# Patient Record
Sex: FEMALE | Race: BLACK OR AFRICAN AMERICAN | NOT HISPANIC OR LATINO | Employment: UNEMPLOYED | URBAN - METROPOLITAN AREA
[De-identification: names, ages, dates, MRNs, and addresses within clinical notes are randomized per-mention and may not be internally consistent; named-entity substitution may affect disease eponyms.]

---

## 2018-11-26 ENCOUNTER — HOSPITAL ENCOUNTER (EMERGENCY)
Facility: HOSPITAL | Age: 38
Discharge: HOME | End: 2018-11-26
Attending: EMERGENCY MEDICINE
Payer: MEDICAID

## 2018-11-26 VITALS
DIASTOLIC BLOOD PRESSURE: 82 MMHG | HEART RATE: 81 BPM | OXYGEN SATURATION: 100 % | HEIGHT: 66 IN | BODY MASS INDEX: 44.42 KG/M2 | RESPIRATION RATE: 20 BRPM | WEIGHT: 276.38 LBS | SYSTOLIC BLOOD PRESSURE: 152 MMHG | TEMPERATURE: 98 F
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
## 2018-11-26 DIAGNOSIS — S30.0XXA COCCYX CONTUSION, INITIAL ENCOUNTER: Primary | ICD-10-CM
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
## 2018-11-26 DIAGNOSIS — M54.50 ACUTE MIDLINE LOW BACK PAIN WITHOUT SCIATICA: ICD-10-CM

## 2018-11-26 PROCEDURE — 99284 EMERGENCY DEPT VISIT MOD MDM: CPT | Mod: 25

## 2018-11-26 RX ORDER — METAXALONE 800 MG/1
800 TABLET ORAL 2 TIMES DAILY PRN
Qty: 14 TABLET | Refills: 0 | OUTPATIENT
Start: 2018-11-26 | End: 2018-11-28

## 2018-11-26 RX ORDER — IBUPROFEN 800 MG/1
800 TABLET ORAL 3 TIMES DAILY
Qty: 21 TABLET | Refills: 0 | OUTPATIENT
Start: 2018-11-26 | End: 2018-11-28

## 2018-11-26 NOTE — ED NOTES
"Pt stated " I fell at work yesterday and I did not feel anything until about 4 am this morning, my back felt like it was tightening up and my hand and foot felt numb."  "

## 2018-11-26 NOTE — ED PROVIDER NOTES
This SmartLink is deprecated. Use AVSMEDLIST instead to display the medication list for a patient. eMERGENCY dEPARTMENT eNCOUnter    CHIEF COMPLAINT    Chief Complaint   Patient presents with    Back Pain     Pt states was at work at [employer] yesterday and fell while standing and cooking, denies LOC.  States having lower to mid back pain.        LUIS DANIEL Jarrett is a 38 y.o. female who presents to the ED with lower back pain. States she was at work yesterday and slipped in the grease and fell and landed on her back.  She states she was fine at 1st but then woke up around 4:00 a.m. this morning in severe pain. She denies numbness or tingling.  She denies incontinence.  She denies head injury or neck pain.      CURRENT MEDICATIONS    No current facility-administered medications on file prior to encounter.      No current outpatient medications on file prior to encounter.         ALLERGIES    Review of patient's allergies indicates:  No Known Allergies    PAST MEDICAL HISTORY  History reviewed. No pertinent past medical history.    SURGICAL HISTORY    Past Surgical History:   Procedure Laterality Date     SECTION         SOCIAL HISTORY    Social History     Socioeconomic History    Marital status: Single     Spouse name: None    Number of children: None    Years of education: None    Highest education level: None   Social Needs    Financial resource strain: None    Food insecurity - worry: None    Food insecurity - inability: None    Transportation needs - medical: None    Transportation needs - non-medical: None   Occupational History    None   Tobacco Use    Smoking status: Current Every Day Smoker     Types: Cigarettes    Smokeless tobacco: Never Used   Substance and Sexual Activity    Alcohol use: No    Drug use: No    Sexual activity: None   Other Topics Concern    None   Social History Narrative    None       FAMILY HISTORY    Family History   Problem Relation Age of Onset  "   No Known Problems Mother     No Known Problems Father        REVIEW OF SYSTEMS   ROS  Constitutional:  No fever, chills, weight loss or weakness.   Eyes:  No  Photophobia, blurred vision or discharge.   HENT:  No ear pain, nasal congestion or sore throat..  Respiratory:  No cough, shortness of breath or wheezing.   Cardiovascular:  No chest pain, palpitations or swelling.   GI:  No abdominal pain, nausea, vomiting, or diarrhea.  : No dysuria, frequency   Musculoskeletal:  Reports lower back pain. Denies neck pain.   Skin:  No reported rashes or infected lesions.   Neurologic:  No reported headache, focal weakness or sensory changes.     All Systems otherwise negative except as noted in the History of Present Illness.        PHYSICAL EXAM    Reviewed Triage Note  VITAL SIGNS: Temp 98.3 °F (36.8 °C) (Oral)   Ht 5' 6.06" (1.678 m)   Wt 125.4 kg (276 lb 5.6 oz)   LMP 10/31/2018   BMI 44.52 kg/m²    Vitals:    11/26/18 1140   Temp: 98.3 °F (36.8 °C)       Physical Exam  Nursing Notes and Vital Signs Reviewed  Constitutional:  Well-developed, well-nourished, afebrile nontoxic-appearing 38-year-old female in NAD.  HENT:  Normocephalic, atraumatic. Bilateral external EACs normal. Nose normal,no rhinorrhea. Mouth mucus membranes P & M.   Eyes:  PERRL EOMI. Conjunctiva normal without discharge.   Neck: Normal range of motion. No midline tenderness or vertebral step-off. No stridor. No meningismus. No lymphadenopathy.   Respiratory:  Normal breath sounds bilaterally.  No respiratory distress, retractions, or conversational dyspnea. No wheezing. No rhonchi. No rales.   Cardiovascular:  Normal heart rate. Normal rhythm. No pitting lower extremity edema.   Musculoskeletal:  Lumbosacral No gross deformity.  No bony deformity or vertebral step-off.  Pain with rotation of trunk or bending at the waist. No palpable bony deformity. Noted tenderness to palpation sacral and coccyx area.  Integument:  Warm and dry. No rash. No " petechiae  Neurologic:  Normal motor function. Normal sensory function. No focal deficits noted. Alert and Interactive. MAEW. Gait steady.   Psychiatric:  Affect normal. Mood normal.         LABS  Pertinent labs reviewed. (See chart for details)           RADIOLOGY    Imaging Results          X-Ray Lumbar Spine Ap And Lateral (Final result)  Result time 11/26/18 13:32:13    Final result by BRE Patel Sr., MD (11/26/18 13:32:13)                 Impression:      1. The lumbar spine is normal in appearance.  2. There are Essure devices projected over the pelvis.      Electronically signed by: Vincenzo Patel MD  Date:    11/26/2018  Time:    13:32             Narrative:    EXAMINATION:  XR LUMBAR SPINE AP AND LATERAL    CLINICAL HISTORY:  Unspecified fall, initial encounterLow back pain, minor trauma;    COMPARISON:  None    FINDINGS:  There are 5 lumbar type vertebral bodies. There is no fracture, spondylolisthesis, or scoliosis. There is normal lumbar lordosis.  There are Essure devices projected over the pelvis.                               X-Ray Sacrum And Coccyx (Final result)  Result time 11/26/18 13:31:14    Final result by BRE Patel Sr., MD (11/26/18 13:31:14)                 Impression:      1. The sacrum and coccyx are normal in appearance.  2. There are Essure devices projected over the pelvis.      Electronically signed by: Vincenzo Patel MD  Date:    11/26/2018  Time:    13:31             Narrative:    EXAMINATION:  XR SACRUM AND COCCYX    CLINICAL HISTORY:  Unspecified fall, initial encounter    COMPARISON:  None    FINDINGS:  The sacrum and coccyx are normal in appearance.  There is no fracture.  There is no dislocation.  There is no spondylolisthesis.  There are Essure devices projected over the pelvis.                                PROCEDURES    Procedures      EKG         ED COURSE & MEDICAL DECISION MAKING    Pertinent & Imaging studies reviewed. (See chart for details and specific  orders.)  38-year-old female with back pain following a slip and fall yesterday at work.  No numbness, tingling or incontinence.  X-ray negative for fracture subluxation.  She was given a prescription for ibuprofen and Skelaxin.  She is advised to follow up with her PCP.  Return if worsening or concerns.    Medications - No data to display        FINAL IMPRESSION    1. Coccyx contusion, initial encounter    2. Fall    3. Acute midline low back pain without sciatica    4. Fall from ground level        Differential Diagnosis:  Cauda equina, lumbar compression fracture, coccyx fracture    Patient advised to follow-up with PCP for re-check                    Amber Dempsey NP  11/26/18 3911

## 2018-11-28 ENCOUNTER — HOSPITAL ENCOUNTER (EMERGENCY)
Facility: HOSPITAL | Age: 38
Discharge: HOME | End: 2018-11-28
Attending: FAMILY MEDICINE
Payer: MEDICAID

## 2018-11-28 VITALS
HEART RATE: 88 BPM | SYSTOLIC BLOOD PRESSURE: 180 MMHG | WEIGHT: 267 LBS | TEMPERATURE: 98 F | OXYGEN SATURATION: 100 % | DIASTOLIC BLOOD PRESSURE: 104 MMHG | RESPIRATION RATE: 18 BRPM | HEIGHT: 66 IN | BODY MASS INDEX: 42.91 KG/M2

## 2018-11-28 DIAGNOSIS — M54.9 ACUTE LEFT-SIDED BACK PAIN, UNSPECIFIED BACK LOCATION: Primary | ICD-10-CM

## 2018-11-28 PROCEDURE — 96372 THER/PROPH/DIAG INJ SC/IM: CPT

## 2018-11-28 PROCEDURE — 63600175 PHARM REV CODE 636 W HCPCS: Performed by: FAMILY MEDICINE

## 2018-11-28 PROCEDURE — 99284 EMERGENCY DEPT VISIT MOD MDM: CPT | Mod: 25

## 2018-11-28 RX ORDER — CYCLOBENZAPRINE HCL 10 MG
10 TABLET ORAL 3 TIMES DAILY PRN
Qty: 15 TABLET | Refills: 0 | Status: SHIPPED | OUTPATIENT
Start: 2018-11-28 | End: 2018-12-03

## 2018-11-28 RX ORDER — DICLOFENAC SODIUM 50 MG/1
50 TABLET, DELAYED RELEASE ORAL 2 TIMES DAILY
Qty: 21 TABLET | Refills: 0 | Status: SHIPPED | OUTPATIENT
Start: 2018-11-28 | End: 2019-12-16 | Stop reason: CLARIF

## 2018-11-28 RX ORDER — KETOROLAC TROMETHAMINE 30 MG/ML
60 INJECTION, SOLUTION INTRAMUSCULAR; INTRAVENOUS
Status: COMPLETED | OUTPATIENT
Start: 2018-11-28 | End: 2018-11-28

## 2018-11-28 RX ADMIN — KETOROLAC TROMETHAMINE 60 MG: 30 INJECTION, SOLUTION INTRAMUSCULAR at 08:11

## 2018-11-28 NOTE — ED PROVIDER NOTES
Encounter Date: 2018       History     Chief Complaint   Patient presents with    Back Pain     patient states that she fell at work on , was recently seen in the ER. Patient states that the medication she was prescribed is not working      38-year-old female comes in with a fall at work approximately 1 week ago was seen in the ED x-rayed given 1 Motrin and Skelaxin patient states the pain is getting worse.  Patient otherwise has no footdrop no loss of bowel bladder function no other complaints.          Review of patient's allergies indicates:  No Known Allergies  No past medical history on file.  Past Surgical History:   Procedure Laterality Date     SECTION       Family History   Problem Relation Age of Onset    No Known Problems Mother     No Known Problems Father      Social History     Tobacco Use    Smoking status: Current Every Day Smoker     Types: Cigarettes    Smokeless tobacco: Never Used   Substance Use Topics    Alcohol use: No    Drug use: No     Review of Systems   Constitutional: Negative for fever.   HENT: Negative for sore throat.    Respiratory: Negative for shortness of breath.    Cardiovascular: Negative for chest pain.   Gastrointestinal: Negative for nausea.   Genitourinary: Negative for dysuria.   Musculoskeletal: Positive for back pain.   Skin: Negative for rash.   Neurological: Negative for weakness.   Hematological: Does not bruise/bleed easily.   All other systems reviewed and are negative.      Physical Exam     Initial Vitals [18 0805]   BP Pulse Resp Temp SpO2   (!) 180/104 88 18 98.4 °F (36.9 °C) 100 %      MAP       --         Physical Exam    Nursing note and vitals reviewed.  Constitutional: She appears well-developed.   HENT:   Head: Normocephalic and atraumatic.   Right Ear: External ear normal.   Left Ear: External ear normal.   Nose: Nose normal.   Mouth/Throat: Oropharynx is clear and moist.   Eyes: Conjunctivae and EOM are normal. Pupils are  equal, round, and reactive to light. Right eye exhibits no discharge. Left eye exhibits no discharge.   Neck: Normal range of motion. Neck supple. No tracheal deviation present.   Cardiovascular: Normal rate, regular rhythm and normal heart sounds.   No murmur heard.  Pulmonary/Chest: Breath sounds normal. No respiratory distress. She has no wheezes.   Abdominal: Soft. Bowel sounds are normal.   Musculoskeletal: She exhibits tenderness.   Neurological: She is alert and oriented to person, place, and time.   Skin: Skin is warm and dry.         ED Course   Procedures  Labs Reviewed - No data to display       Imaging Results    None          Medical Decision Making:   Initial Assessment:   Patient in moderate distress and no other complains    Differential Diagnosis:   Lbp, disk herniation, injury, sprain , strain                        Clinical Impression:   The encounter diagnosis was Acute left-sided back pain, unspecified back location.                             Mat Haque MD  11/28/18 4689

## 2019-12-16 ENCOUNTER — HOSPITAL ENCOUNTER (EMERGENCY)
Facility: HOSPITAL | Age: 39
Discharge: HOME | End: 2019-12-16
Attending: FAMILY MEDICINE
Payer: MEDICAID

## 2019-12-16 VITALS
SYSTOLIC BLOOD PRESSURE: 170 MMHG | WEIGHT: 250 LBS | RESPIRATION RATE: 18 BRPM | OXYGEN SATURATION: 100 % | BODY MASS INDEX: 39.24 KG/M2 | HEIGHT: 67 IN | HEART RATE: 98 BPM | DIASTOLIC BLOOD PRESSURE: 98 MMHG

## 2019-12-16 DIAGNOSIS — K08.89 TOOTHACHE: Primary | ICD-10-CM

## 2019-12-16 DIAGNOSIS — H61.21 IMPACTED CERUMEN OF RIGHT EAR: ICD-10-CM

## 2019-12-16 PROCEDURE — 25000003 PHARM REV CODE 250: Mod: ER | Performed by: FAMILY MEDICINE

## 2019-12-16 PROCEDURE — 69210 REMOVE IMPACTED EAR WAX UNI: CPT | Mod: RT,ER

## 2019-12-16 PROCEDURE — 99284 EMERGENCY DEPT VISIT MOD MDM: CPT | Mod: 25,ER

## 2019-12-16 RX ORDER — NAPROXEN 500 MG/1
500 TABLET ORAL 2 TIMES DAILY
Qty: 20 TABLET | Refills: 0 | Status: SHIPPED | OUTPATIENT
Start: 2019-12-16

## 2019-12-16 RX ORDER — ACETAMINOPHEN 500 MG
1000 TABLET ORAL 3 TIMES DAILY PRN
Qty: 30 TABLET | Refills: 0
Start: 2019-12-16 | End: 2020-06-21

## 2019-12-16 RX ORDER — KETOROLAC TROMETHAMINE 10 MG/1
10 TABLET, FILM COATED ORAL
Status: COMPLETED | OUTPATIENT
Start: 2019-12-16 | End: 2019-12-16

## 2019-12-16 RX ADMIN — KETOROLAC TROMETHAMINE 10 MG: 10 TABLET, FILM COATED ORAL at 05:12

## 2019-12-16 NOTE — ED PROVIDER NOTES
"Encounter Date: 2019       History     Chief Complaint   Patient presents with    Dental Pain     40 y/o F to er with c/o "thumping dental pain I woke up with this morning on right side on top and bottom."     39-year-old female presents with chief complaint of dental pain. Patient reports something dental pain which started last night.  Reports also having difficulty hearing out of her right ear so decided presents emergency room for evaluation this morning.  Patient reports has had similar complaints multiple times in the past.  Reports unable to see a dentist at present because she has a additional family responsibilities with her children.        Review of patient's allergies indicates:  No Known Allergies  History reviewed. No pertinent past medical history.  Past Surgical History:   Procedure Laterality Date     SECTION       Family History   Problem Relation Age of Onset    No Known Problems Mother     No Known Problems Father      Social History     Tobacco Use    Smoking status: Current Every Day Smoker     Types: Cigarettes    Smokeless tobacco: Never Used   Substance Use Topics    Alcohol use: No    Drug use: No     Review of Systems   Constitutional: Negative for chills and fever.   HENT: Positive for dental problem. Negative for ear pain.    Respiratory: Negative for shortness of breath.    All other systems reviewed and are negative.      Physical Exam     Initial Vitals [19 0454]   BP Pulse Resp Temp SpO2   (!) 167/100 98 18 -- 100 %      MAP       --         Physical Exam    Nursing note and vitals reviewed.  Constitutional: She appears well-developed and well-nourished.   HENT:   Head: Normocephalic and atraumatic.   Mouth/Throat: Oropharynx is clear and moist. Abnormal dentition. Dental caries present. No tonsillar abscesses.       Eyes: Conjunctivae and EOM are normal. Pupils are equal, round, and reactive to light.   Neck: Normal range of motion. Neck supple. "   Cardiovascular: Normal rate, regular rhythm and normal heart sounds.   Pulmonary/Chest: Breath sounds normal.   Abdominal: Soft.   Musculoskeletal: Normal range of motion.   Lymphadenopathy:     She has cervical adenopathy.   Neurological: She is alert and oriented to person, place, and time. She has normal strength. GCS score is 15. GCS eye subscore is 4. GCS verbal subscore is 5. GCS motor subscore is 6.   Skin: Skin is warm. Capillary refill takes less than 2 seconds.   Psychiatric: She has a normal mood and affect. Her behavior is normal. Thought content normal.         ED Course   Procedures  Labs Reviewed - No data to display       Imaging Results    None                                          Clinical Impression:       ICD-10-CM ICD-9-CM   1. Toothache K08.89 525.9   2. Impacted cerumen of right ear H61.21 380.4                             Gilmar Robbins MD  12/16/19 0521

## 2019-12-16 NOTE — ED NOTES
Pt brought from triage to rm 7 to triage in rm.  Pt ambulatory, MAEW, with NADN at this time.   Pt sitting on side of bed (locked and low), with SRx1.  Call bell in reach.

## 2019-12-16 NOTE — ED NOTES
Educated pt to follow up, medications recommended, and when to return to the ER  Pt verbalized understanding.

## 2020-06-21 ENCOUNTER — HOSPITAL ENCOUNTER (EMERGENCY)
Facility: HOSPITAL | Age: 40
Discharge: HOME | End: 2020-06-21
Attending: EMERGENCY MEDICINE
Payer: MEDICAID

## 2020-06-21 VITALS
OXYGEN SATURATION: 100 % | HEART RATE: 80 BPM | HEIGHT: 66 IN | TEMPERATURE: 99 F | DIASTOLIC BLOOD PRESSURE: 98 MMHG | WEIGHT: 293 LBS | BODY MASS INDEX: 47.09 KG/M2 | SYSTOLIC BLOOD PRESSURE: 175 MMHG | RESPIRATION RATE: 18 BRPM

## 2020-06-21 DIAGNOSIS — I10 HYPERTENSION, UNSPECIFIED TYPE: ICD-10-CM

## 2020-06-21 DIAGNOSIS — K08.89 PAIN, DENTAL: Primary | ICD-10-CM

## 2020-06-21 LAB
ALBUMIN SERPL BCP-MCNC: 4 G/DL (ref 3.5–5.2)
ALP SERPL-CCNC: 98 U/L (ref 38–126)
ALT SERPL W/O P-5'-P-CCNC: 27 U/L (ref 10–44)
ANION GAP SERPL CALC-SCNC: 8 MMOL/L (ref 8–16)
AST SERPL-CCNC: 29 U/L (ref 15–46)
B-HCG UR QL: NEGATIVE
BILIRUB SERPL-MCNC: 0.7 MG/DL (ref 0.1–1)
BUN SERPL-MCNC: 12 MG/DL (ref 7–17)
CALCIUM SERPL-MCNC: 9.3 MG/DL (ref 8.7–10.5)
CHLORIDE SERPL-SCNC: 103 MMOL/L (ref 95–110)
CO2 SERPL-SCNC: 27 MMOL/L (ref 23–29)
CREAT SERPL-MCNC: 0.95 MG/DL (ref 0.5–1.4)
EST. GFR  (AFRICAN AMERICAN): >60 ML/MIN/1.73 M^2
EST. GFR  (NON AFRICAN AMERICAN): >60 ML/MIN/1.73 M^2
GLUCOSE SERPL-MCNC: 109 MG/DL (ref 70–110)
POTASSIUM SERPL-SCNC: 4.1 MMOL/L (ref 3.5–5.1)
PROT SERPL-MCNC: 7.5 G/DL (ref 6–8.4)
SODIUM SERPL-SCNC: 138 MMOL/L (ref 136–145)

## 2020-06-21 PROCEDURE — 81025 URINE PREGNANCY TEST: CPT | Mod: ER

## 2020-06-21 PROCEDURE — 80053 COMPREHEN METABOLIC PANEL: CPT | Mod: ER

## 2020-06-21 PROCEDURE — 96372 THER/PROPH/DIAG INJ SC/IM: CPT | Mod: ER

## 2020-06-21 PROCEDURE — 25000003 PHARM REV CODE 250: Mod: ER | Performed by: EMERGENCY MEDICINE

## 2020-06-21 PROCEDURE — 63600175 PHARM REV CODE 636 W HCPCS: Mod: ER | Performed by: EMERGENCY MEDICINE

## 2020-06-21 PROCEDURE — 99284 EMERGENCY DEPT VISIT MOD MDM: CPT | Mod: 25,ER

## 2020-06-21 RX ORDER — IBUPROFEN 600 MG/1
600 TABLET ORAL EVERY 6 HOURS PRN
Qty: 28 TABLET | Refills: 0 | Status: SHIPPED | OUTPATIENT
Start: 2020-06-21 | End: 2021-08-22 | Stop reason: ALTCHOICE

## 2020-06-21 RX ORDER — AMLODIPINE BESYLATE 5 MG/1
5 TABLET ORAL DAILY
Qty: 30 TABLET | Refills: 0 | Status: SHIPPED | OUTPATIENT
Start: 2020-06-21 | End: 2020-07-21

## 2020-06-21 RX ORDER — PENICILLIN V POTASSIUM 500 MG/1
500 TABLET, FILM COATED ORAL 4 TIMES DAILY
Qty: 20 TABLET | Refills: 0 | Status: SHIPPED | OUTPATIENT
Start: 2020-06-21 | End: 2020-06-26

## 2020-06-21 RX ORDER — CAPTOPRIL 12.5 MG/1
12.5 TABLET ORAL
Status: COMPLETED | OUTPATIENT
Start: 2020-06-21 | End: 2020-06-21

## 2020-06-21 RX ORDER — ACETAMINOPHEN 500 MG
500 TABLET ORAL EVERY 6 HOURS PRN
Qty: 28 TABLET | Refills: 0 | Status: SHIPPED | OUTPATIENT
Start: 2020-06-21

## 2020-06-21 RX ORDER — KETOROLAC TROMETHAMINE 30 MG/ML
30 INJECTION, SOLUTION INTRAMUSCULAR; INTRAVENOUS
Status: COMPLETED | OUTPATIENT
Start: 2020-06-21 | End: 2020-06-21

## 2020-06-21 RX ADMIN — CAPTOPRIL 12.5 MG: 12.5 TABLET ORAL at 08:06

## 2020-06-21 RX ADMIN — KETOROLAC TROMETHAMINE 30 MG: 30 INJECTION, SOLUTION INTRAMUSCULAR at 08:06

## 2020-06-21 NOTE — ED PROVIDER NOTES
Encounter Date: 2020       History     Chief Complaint   Patient presents with    Dental Pain     Pt with c/o dental pain to topr R side of mouth since yesterday.      Aleshia Jarrett is a 39 y.o. female who  has no past medical history on file.  She presents today with dental pain onset this morning at 4:00 a.m..  She tried smoking a cigarette to relieve her symptoms which did not help.  Patient was seen here in 2019 for similar symptoms however had not followed up with a dentist signs due to finding issues.  Patient is also noted to be hypertensive in triage and subsequently repeat blood pressure is 202/89.  She denies history of high blood pressure.  She has not had her kidney function checked.          Review of patient's allergies indicates:  No Known Allergies  History reviewed. No pertinent past medical history.  Past Surgical History:   Procedure Laterality Date     SECTION       Family History   Problem Relation Age of Onset    No Known Problems Mother     No Known Problems Father      Social History     Tobacco Use    Smoking status: Current Every Day Smoker     Types: Cigarettes    Smokeless tobacco: Never Used   Substance Use Topics    Alcohol use: No    Drug use: No     Review of Systems   Constitutional: Negative for chills and fever.   HENT: Negative for sore throat.    Respiratory: Negative for cough and shortness of breath.    Cardiovascular: Negative for chest pain.   Gastrointestinal: Negative for nausea and vomiting.   Genitourinary: Negative for dysuria, frequency and urgency.   Musculoskeletal: Negative for back pain, neck pain and neck stiffness.   Skin: Negative for rash and wound.   Neurological: Negative for syncope and weakness.   Hematological: Does not bruise/bleed easily.   Psychiatric/Behavioral: Negative for agitation, behavioral problems and confusion.       Physical Exam     Initial Vitals [20 0729]   BP Pulse Resp Temp SpO2   (!) 176/105 88 18  98.5 °F (36.9 °C) 100 %      MAP       --         Physical Exam    Constitutional: No distress.   HENT:   Head: Normocephalic and atraumatic.   Mouth/Throat:       Dental caries, no evidence of abscess.  Tooth causing pain as shown with severe pulpitis   Eyes: Conjunctivae are normal.   Cardiovascular: Intact distal pulses.   Pulmonary/Chest: No respiratory distress.   Neurological: She is alert.   Skin: Skin is warm and dry.   Psychiatric: She has a normal mood and affect.         ED Course   Procedures  Labs Reviewed   PREGNANCY TEST, URINE RAPID    Narrative:     Specimen Source->Urine   COMPREHENSIVE METABOLIC PANEL          Imaging Results    None          Medical Decision Making:   Differential Diagnosis:   Differential Diagnosis includes, but is not limited to:  Del's angina, acute necrotizing ulcerative gingivitis, epiglottitis, parotitis, gingival abscess, facial cellulitis, peritonsillar/retropharyngeal abscess, sialolithiasis, periapical abscess, pulpitis, dental fracture, dental caries, aphthous ulcer.    ED Management:  After complete evaluation, including thorough history and physical exam, the patient's symptoms are most consistent with benign dentalgia due to dental caries. Physical exam is benign, without significant facial, tongue, or neck swelling to suggest cellulitis, abscess, or Del's angina. The patient is tolerating secretions without drooling, dysphagia, or voice changes to suggest deep space neck infection. There is no intraoral or gingival fluctuance to suggest abscess requiring incision/drainage at this time. However, due to inability to completely rule out pulpitis or periapical abscess, plan to cover with Pen-VK.  Recommend symptomatic care with NSAIDs for pain in interim. Patient was instructed to follow-up with a dentist as soon as possible for further evaluation and tooth extraction if needed.    After taking into careful account the historical factors and physical exam  findings of the patient's presentation today, in conjunction with the empirical and objective data obtained on ED workup, no acute emergent medical condition has been identified. The patient appears to be low risk for an emergent medical condition and I feel it is safe and appropriate at this time for the patient to be discharged to follow-up as detailed in their discharge instructions for reevaluation and possible continued outpatient workup and management. I have discussed the specifics of the workup with the patient and the patient has verbalized understanding of the details of the workup, the diagnosis, the treatment plan, and the need for outpatient follow-up.  Although the patient has no emergent etiology today this does not preclude the development of an emergent condition so in addition, I have advised the patient that they can return to the ED and/or activate EMS at any time with worsening of their symptoms, change of their symptoms, or with any other medical complaint.  The patient remained comfortable and stable during their visit in the ED.  Discharge and follow-up instructions discussed with the patient who expressed understanding and willingness to comply with my recommendations.                     ED Course as of Jun 21 0914   Sun Jun 21, 2020   0736 Patient is sleeping on my evaluation    [RN]   0758 Patient presents with dental pain however she is noted to be hypertensive.  Chart review reveals similar with elevated blood pressures.  She has not had her kidney function checked on chart review and does not have a diagnosis of high blood pressure. Her dental exam shows numerous caries, worse in the right upper molar. Feel she would benefit from treatment for hypertension and will refer to PCP and dental for her toothache.     [RN]   0902 Comprehensive metabolic panel [RN]   0913 Labs reviewed.  No significant abnormality.  Blood pressure has improved.  Will start on amlodipine Motrin Tylenol for  pain.  Ambulatory referral for [facility] made given dental resources.  Return precautions discussed for emergent conditions, fever swelling or any other concerns.    [RN]      ED Course User Index  [RN] Kaiden De Jr., MD                Clinical Impression:       ICD-10-CM ICD-9-CM   1. Pain, dental  K08.89 525.9   2. Hypertension, unspecified type  I10 401.9       Portions of this note were dictated using voice recognition software and may contain dictation related errors in spelling/grammar/syntax not found on text review                           Kaiden De Jr., MD  06/21/20 0916

## 2021-08-22 ENCOUNTER — HOSPITAL ENCOUNTER (EMERGENCY)
Facility: HOSPITAL | Age: 41
Discharge: HOME | End: 2021-08-22
Attending: EMERGENCY MEDICINE
Payer: MEDICAID

## 2021-08-22 VITALS
TEMPERATURE: 99 F | HEIGHT: 66 IN | SYSTOLIC BLOOD PRESSURE: 176 MMHG | BODY MASS INDEX: 40.18 KG/M2 | RESPIRATION RATE: 20 BRPM | DIASTOLIC BLOOD PRESSURE: 93 MMHG | OXYGEN SATURATION: 97 % | WEIGHT: 250 LBS | HEART RATE: 80 BPM

## 2021-08-22 DIAGNOSIS — S39.012A STRAIN OF LUMBAR REGION, INITIAL ENCOUNTER: Primary | ICD-10-CM

## 2021-08-22 PROCEDURE — 96372 THER/PROPH/DIAG INJ SC/IM: CPT | Mod: ER

## 2021-08-22 PROCEDURE — 99284 EMERGENCY DEPT VISIT MOD MDM: CPT | Mod: 25,ER

## 2021-08-22 PROCEDURE — 63600175 PHARM REV CODE 636 W HCPCS: Mod: ER | Performed by: EMERGENCY MEDICINE

## 2021-08-22 PROCEDURE — 25000003 PHARM REV CODE 250: Mod: ER | Performed by: EMERGENCY MEDICINE

## 2021-08-22 RX ORDER — TRAMADOL HYDROCHLORIDE 50 MG/1
50 TABLET ORAL EVERY 6 HOURS PRN
Qty: 12 TABLET | Refills: 0 | Status: SHIPPED | OUTPATIENT
Start: 2021-08-22

## 2021-08-22 RX ORDER — CYCLOBENZAPRINE HCL 10 MG
10 TABLET ORAL
Status: COMPLETED | OUTPATIENT
Start: 2021-08-22 | End: 2021-08-22

## 2021-08-22 RX ORDER — TIZANIDINE 4 MG/1
4 TABLET ORAL EVERY 8 HOURS PRN
Qty: 15 TABLET | Refills: 0 | Status: SHIPPED | OUTPATIENT
Start: 2021-08-22 | End: 2021-09-01

## 2021-08-22 RX ORDER — DICLOFENAC SODIUM 50 MG/1
50 TABLET, DELAYED RELEASE ORAL 3 TIMES DAILY PRN
Qty: 30 TABLET | Refills: 0 | Status: SHIPPED | OUTPATIENT
Start: 2021-08-22

## 2021-08-22 RX ORDER — KETOROLAC TROMETHAMINE 30 MG/ML
60 INJECTION, SOLUTION INTRAMUSCULAR; INTRAVENOUS
Status: COMPLETED | OUTPATIENT
Start: 2021-08-22 | End: 2021-08-22

## 2021-08-22 RX ADMIN — KETOROLAC TROMETHAMINE 60 MG: 30 INJECTION, SOLUTION INTRAMUSCULAR; INTRAVENOUS at 05:08

## 2021-08-22 RX ADMIN — CYCLOBENZAPRINE 10 MG: 10 TABLET, FILM COATED ORAL at 05:08

## 2021-12-28 ENCOUNTER — HOSPITAL ENCOUNTER (EMERGENCY)
Facility: HOSPITAL | Age: 41
Discharge: LEFT BEFORE DISCHARGE | End: 2021-12-28
Attending: EMERGENCY MEDICINE
Payer: MEDICAID

## 2021-12-28 VITALS
HEIGHT: 66 IN | WEIGHT: 280 LBS | DIASTOLIC BLOOD PRESSURE: 91 MMHG | RESPIRATION RATE: 18 BRPM | OXYGEN SATURATION: 98 % | TEMPERATURE: 99 F | BODY MASS INDEX: 45 KG/M2 | SYSTOLIC BLOOD PRESSURE: 169 MMHG | HEART RATE: 86 BPM

## 2021-12-28 DIAGNOSIS — U07.1 COVID: Primary | ICD-10-CM

## 2021-12-28 LAB
INFLUENZA A, MOLECULAR: NEGATIVE
INFLUENZA B, MOLECULAR: NEGATIVE
SARS-COV-2 RDRP RESP QL NAA+PROBE: POSITIVE
SPECIMEN SOURCE: NORMAL

## 2021-12-28 PROCEDURE — 99283 EMERGENCY DEPT VISIT LOW MDM: CPT | Mod: 25,ER

## 2021-12-28 PROCEDURE — U0002 COVID-19 LAB TEST NON-CDC: HCPCS | Mod: ER | Performed by: EMERGENCY MEDICINE

## 2021-12-28 PROCEDURE — 87502 INFLUENZA DNA AMP PROBE: CPT | Mod: ER | Performed by: EMERGENCY MEDICINE

## 2021-12-28 RX ORDER — ACETAMINOPHEN 500 MG
1000 TABLET ORAL EVERY 6 HOURS PRN
Qty: 50 TABLET | Refills: 0 | Status: SHIPPED | OUTPATIENT
Start: 2021-12-28

## 2021-12-28 NOTE — Clinical Note
"Aleshia"Michael Jarrett was seen and treated in our emergency department on 12/28/2021.     COVID-19 is present in our communities across the state. There is limited testing for COVID at this time, so not all patients can be tested. In this situation, your employee meets the following criteria:    Aleshia Jarrett has met the criteria for COVID-19 testing and has a POSITIVE result. She can return to work once they are asymptomatic for 72 hours without the use of fever reducing medications AND at least ten days from the first positive result.     If you have any questions or concerns, or if I can be of further assistance, please do not hesitate to contact me.    Sincerely,             Jeb Tavera MD"

## 2021-12-29 NOTE — DISCHARGE INSTRUCTIONS
Mrs. Jarrett,    Thank you for letting me care for you today! It was nice meeting you, and I hope you feel better soon.   If you would like access to your chart and what was done today please utilize the [facility].   Please come back to [facility] for all of your future medical needs.    Our goal in the emergency department is to always give you outstanding care and exceptional service. You may receive a survey by mail or e-mail in the next week regarding your experience in our ED. We would greatly appreciate you completing and returning the survey. Your feedback provides us with a way to recognize our staff who give very good care and it helps us learn how to improve when your experience was below our aspiration of excellence.     Sincerely,    Jeb Tavera MD  Board Certified Emergency Physician

## 2021-12-29 NOTE — ED PROVIDER NOTES
Encounter Date: 2021       History     Chief Complaint   Patient presents with    Influenza     PT reports she wants to see if she has the flu. Body aches, headaches, and nasal drainage x1 day. No fever reported. Tylenol Cold taken at 12 pm with no relief. No other symptoms reported.      41-year-old female who presents for evaluation of body aches headaches for 1 day.  She started feeling bad while work today, thereafter decided to be evaluated emergency department.  She denies any known sick contacts but does work at the [employer].  She has not received the flu vaccine or the COVID vaccine this year.  She has never had anything like this in the past she can recall, nothing in particular seems to make it any better or worse.        Review of patient's allergies indicates:  No Known Allergies  History reviewed. No pertinent past medical history.  Past Surgical History:   Procedure Laterality Date     SECTION       Family History   Problem Relation Age of Onset    No Known Problems Mother     No Known Problems Father      Social History     Tobacco Use    Smoking status: Current Every Day Smoker     Packs/day: 0.25     Types: Cigarettes    Smokeless tobacco: Never Used   Substance Use Topics    Alcohol use: No    Drug use: No     Review of Systems  Constitutional-+ fever  HEENT-no congestion  Eyes-no redness  Respiratory-no shortness of breath  Cardio-no chest pain  GI-no abdominal pain  Endocrine-no cold intolerance  -no difficulty urinating  MSK-no myalgias  Skin-no rashes  Allergy-no environmental allergy  Neurologic-positive headache  Hematology-no swollen nodes  Behavioral-no confusion  Physical Exam     Initial Vitals [21 2207]   BP Pulse Resp Temp SpO2   (!) 169/91 86 18 99.1 °F (37.3 °C) 98 %      MAP       --         Physical Exam  Constitutional: Well appearing, no distress.  Eyes: Conjunctivae normal.  ENT       Head: Normocephalic, atraumatic.       Nose:  Normal external appearance        Mouth/Throat: no strigulous respirations   Hematological/Lymphatic/Immunilogical: no visible lymphadenopathy   Cardiovascular: Normal rate,   Respiratory: Normal respiratory effort.   Gastrointestinal: non distended   Musculoskeletal: Normal range of motion in all extremities. No obvious deformities or swelling.  Neurologic: Alert, oriented. Normal speech and language. No gross focal neurologic deficits are appreciated.  Skin: Skin is warm, dry. No rash noted.  Psychiatric: Mood and affect are normal.   ED Course   Procedures  Labs Reviewed   SARS-COV-2 RNA AMPLIFICATION, QUAL - Abnormal; Notable for the following components:       Result Value    SARS-CoV-2 RNA, Amplification, Qual Positive (*)     All other components within normal limits    Narrative:     Is the patient symptomatic?->Yes  Covid result(s) called and verbal readback obtained from G[facility] Wade by   LUIS 12/28/2021 22:29   INFLUENZA A & B BY MOLECULAR          Imaging Results    None          Medications - No data to display  Medical Decision Making:   Differential Diagnosis:   Given the current prevalent for severe respiratory infections and minimally symptomatic respiratory infections resultant from Coronavirus this serious consideration.  They are also other considerations however such as pneumonia, sepsis, influenza among myriad of other respiratory infections such as RSV or atypical pneumonia.  These are all thoroughly considered in the differential diagnosis of this patient, given testing results clinical impression inpatient appearance a discussion was made about the most likely diagnosis in the importance of treatment with appropriate quarantine precautions.  Clinical Tests:   Lab Tests: Ordered and Reviewed                      Clinical Impression:   Final diagnoses:  [U07.1] COVID (Primary)          ED Disposition Condition    Discharge Stable        ED Prescriptions     Medication Sig Dispense Start Date End  Date Auth. Provider    acetaminophen (TYLENOL) 500 MG tablet Take 2 tablets (1,000 mg total) by mouth every 6 (six) hours as needed for Pain or Temperature greater than. 50 tablet 12/28/2021  Jeb Tavera MD        Follow-up Information     Follow up With Specialties Details Why Contact [facility] - Emergency Dept Emergency Medicine Go to  If symptoms worsen [address] [facility] [address] [phone]           Jeb Tavera MD  12/28/21 4509

## 2021-12-29 NOTE — ED NOTES
Multiple attempts made to discharge pt. Pt is no longer in lobby. Evaluated by provider prior to elopement.

## 2022-01-04 ENCOUNTER — LAB VISIT (OUTPATIENT)
Dept: PRIMARY CARE CLINIC | Facility: OTHER | Age: 42
End: 2022-01-04
Attending: INTERNAL MEDICINE
Payer: MEDICAID

## 2022-01-04 DIAGNOSIS — U07.1 COVID-19: Primary | ICD-10-CM

## 2022-01-04 LAB
CTP QC/QA: YES
SARS-COV-2 AG RESP QL IA.RAPID: POSITIVE

## 2022-01-04 PROCEDURE — 87811 SARS CORONAVIRUS 2 ANTIGEN POCT, MANUAL READ: ICD-10-PCS | Mod: ,,, | Performed by: INTERNAL MEDICINE

## 2022-01-04 PROCEDURE — 87811 SARS-COV-2 COVID19 W/OPTIC: CPT | Mod: ,,, | Performed by: INTERNAL MEDICINE

## 2022-01-04 NOTE — PROGRESS NOTES
Instructions for Patients with Confirmed or Suspected COVID-19    If you are awaiting your test result, you will either be called or it will be released to the patient portal.  If you have any questions about your test, please visit [email or web address] or call our COVID-19 information line at [phone].      Please isolate yourself at home.  You may leave home and/or return to work once the following conditions are met:    If you have symptoms and tested positive:   More than 5 days since symptoms first appeared AND   More than 24 hours fever free without medications AND       symptoms have improved   · For five days after ending isolation, masks are required.    If you had no symptoms but tested positive:   More than 5 days since the date of the first positive test. If you develop symptoms, then use the guidelines above  · For five days after ending isolation, masks are required.      Testing is not recommended if you are symptom free after completing isolation.

## 2024-01-05 ENCOUNTER — HOSPITAL ENCOUNTER (EMERGENCY)
Facility: HOSPITAL | Age: 44
Discharge: HOME | End: 2024-01-05
Attending: EMERGENCY MEDICINE
Payer: MEDICAID

## 2024-01-05 VITALS
HEIGHT: 61 IN | SYSTOLIC BLOOD PRESSURE: 167 MMHG | DIASTOLIC BLOOD PRESSURE: 89 MMHG | BODY MASS INDEX: 47.2 KG/M2 | OXYGEN SATURATION: 99 % | RESPIRATION RATE: 19 BRPM | HEART RATE: 68 BPM | TEMPERATURE: 99 F | WEIGHT: 250 LBS

## 2024-01-05 DIAGNOSIS — J10.1 INFLUENZA B: Primary | ICD-10-CM

## 2024-01-05 LAB
GROUP A STREP, MOLECULAR: NEGATIVE
INFLUENZA A, MOLECULAR: NEGATIVE
INFLUENZA B, MOLECULAR: POSITIVE
SARS-COV-2 RDRP RESP QL NAA+PROBE: NEGATIVE
SPECIMEN SOURCE: ABNORMAL

## 2024-01-05 PROCEDURE — 87502 INFLUENZA DNA AMP PROBE: CPT | Mod: ER | Performed by: EMERGENCY MEDICINE

## 2024-01-05 PROCEDURE — 87651 STREP A DNA AMP PROBE: CPT | Mod: ER | Performed by: EMERGENCY MEDICINE

## 2024-01-05 PROCEDURE — U0002 COVID-19 LAB TEST NON-CDC: HCPCS | Mod: ER | Performed by: EMERGENCY MEDICINE

## 2024-01-05 PROCEDURE — 99282 EMERGENCY DEPT VISIT SF MDM: CPT | Mod: ER

## 2024-01-05 NOTE — ED PROVIDER NOTES
Encounter Date: 2024       History     Chief Complaint   Patient presents with    Influenza     Pt C/O flu like symptoms X 1 week.         Aleshia Jarrett is a 43 y.o. female  has no past medical history on file. presenting to the Emergency Department for flu-like symptoms x1 week.  Patient reports initial nasal congestion, cough, diarrhea, decreased appetite.  Patient's only complaints today are the cough and diarrhea.  Reports 1 episode of loose stool yesterday.  No fevers or chills.  No abdominal pain, nausea, vomiting.  Reports shortness of breath and chest pain associated with the cough.  Patient has tried NyQuil without significant relief.        The history is provided by the patient.     Review of patient's allergies indicates:  No Known Allergies  No past medical history on file.  Past Surgical History:   Procedure Laterality Date     SECTION       Family History   Problem Relation Age of Onset    No Known Problems Mother     No Known Problems Father      Social History     Tobacco Use    Smoking status: Every Day     Current packs/day: 0.25     Types: Cigarettes    Smokeless tobacco: Never   Substance Use Topics    Alcohol use: No    Drug use: No     Review of Systems   Constitutional:  Negative for fever.   HENT:  Positive for congestion and sore throat.    Respiratory:  Positive for cough. Negative for shortness of breath.    Cardiovascular:  Negative for chest pain.   Gastrointestinal:  Positive for diarrhea. Negative for abdominal pain, constipation, nausea and vomiting.   Genitourinary:  Negative for dysuria.   Musculoskeletal:  Negative for back pain.   Skin:  Negative for rash.   Neurological:  Negative for weakness.   Hematological:  Does not bruise/bleed easily.   All other systems reviewed and are negative.      Physical Exam     Initial Vitals [24 1013]   BP Pulse Resp Temp SpO2   (!) 167/89 68 19 98.7 °F (37.1 °C) 99 %      MAP       --         Physical Exam    Nursing note  and vitals reviewed.  Constitutional: She appears well-developed and well-nourished. She is not diaphoretic. No distress.   HENT:   Head: Normocephalic.   Right Ear: Hearing, tympanic membrane and external ear normal.   Left Ear: Hearing, tympanic membrane and external ear normal.   Nose: Nose normal.   Mouth/Throat: Oropharynx is clear and moist.   Eyes: Conjunctivae, EOM and lids are normal. Pupils are equal, round, and reactive to light.   Neck: Neck supple.   Normal range of motion.  Cardiovascular:  Normal rate, regular rhythm and normal heart sounds.           Pulmonary/Chest: Breath sounds normal. No respiratory distress. She has no wheezes. She has no rhonchi.   Abdominal: Abdomen is soft. Bowel sounds are normal. There is no abdominal tenderness. There is no rebound and no guarding.   Musculoskeletal:         General: Normal range of motion.      Cervical back: Normal range of motion and neck supple. No rigidity.     Lymphadenopathy:     She has no cervical adenopathy.   Neurological: She is alert and oriented to person, place, and time. She has normal strength. GCS score is 15. GCS eye subscore is 4. GCS verbal subscore is 5. GCS motor subscore is 6.   Skin: Skin is warm and dry. Capillary refill takes less than 2 seconds. No rash noted.   Psychiatric: She has a normal mood and affect. Her behavior is normal. Judgment and thought content normal.         ED Course   Procedures  Labs Reviewed   INFLUENZA A & B BY MOLECULAR - Abnormal; Notable for the following components:       Result Value    Influenza B, Molecular Positive (*)     All other components within normal limits   GROUP A STREP, MOLECULAR   SARS-COV-2 RNA AMPLIFICATION, QUAL    Narrative:     Is the patient symptomatic?->Yes          Imaging Results    None          Medications - No data to display  Medical Decision Making  This is an emergent evaluation of 43 y.o. female   in the ED presenting for cough and diarrhea. Physical exam reveals a  non-toxic, afebrile, and well-appearing female  in no apparent respiratory distress. Pertinent physical exam findings above. Vital signs stable. If available, previous records reviewed.    My overall impression is influenza B. Differential Diagnoses: Sepsis, meningitis, nasal/aspirated foreign body, OM, OE, nasal polyp, bacterial sinusitis, allergic rhinitis, peritonsillar abscess, retropharyngeal abscess, epiglottitis, bacterial/viral pneumonia, bacterial/viral pharyngitis, croup, bronchiolitis, influenza, viral syndrome     Discharge Meds/Instructions: OTC medications. PCP f/u    There does not appear to be any indication for further emergent testing, observation, or hospitalization at this time.  Patient appears stable for and is comfortable with discharge home. The diagnosis, treatment plan, instructions for follow-up as well as ED return precautions were discussed. Advised to follow-up with PCP for outpatient follow-up in 2-3 days. Signs and symptoms that would warrant immediate return to ED were reviewed prior to discharge. All questions and concerns were asked, answered, and addressed. Patient expressed understanding and agreement with the plan.       Amount and/or Complexity of Data Reviewed  Labs: ordered. Decision-making details documented in ED Course.    Risk  OTC drugs.               ED Course as of 01/05/24 1146   Fri Jan 05, 2024   1120 Group A Strep, Molecular: Negative [LH]   1120 SARS-CoV-2 RNA, Amplification, Qual: Negative [LH]   1120 Influenza A, Molecular: Negative [LH]   1120 Influenza B, Molecular(!): Positive [LH]      ED Course User Index  [LH] Tia Sánchez PA-C                           Clinical Impression:  Final diagnoses:  [J10.1] Influenza B (Primary)          ED Disposition Condition    Discharge Stable          ED Prescriptions    None       Follow-up Information    None          Tia Sánchez PA-C  01/05/24 1146

## 2024-01-05 NOTE — DISCHARGE INSTRUCTIONS
You have been diagnosed with Influenza.   - Rest.    - Drink plenty of fluids.  - Avoid crowds and wash your hands.   - Viral upper respiratory infections typically run their course in 10-14 days. You are  contagious for 24 hours after starting the [place] or 24 hours after last fever, whichever happens last. Tamiflu is only indicated if your symptoms began less than 2 days ago.   - Tylenol (acetaminophen) or Ibuprofen as directed as needed for fever/pain. Avoid tylenol if you have a history of liver disease. Do not take ibuprofen if you have a history of GI bleeding, kidney disease, gastric surgery, or if you take blood thinners.   - You can take the cetirizine/zyrtec as directed. These are antihistamines that can help with runny nose, nasal congestion, sneezing, and helps to dry up post-nasal drip, which usually causes sore throat and cough.  - You can use Flonase (fluticasone) nasal spray as directed for sinus congestion and postnasal drip. This is a steroid nasal spray that works locally over time to decrease the inflammation in your nose/sinuses and help with allergic symptoms. This is not an quick- relief spray like afrin, but it works well if used daily.  Discontinue if you develop a nose bleed.  - use nasal saline prior to Flonase.  - Use Ocean Spray Nasal Saline 1-3 puffs each nostril every 2-3 hours then blow out onto tissue. This is to irrigate the nasal passage way to clear the sinus openings. Use until sinus problem resolved.  - Warm salt water gargles, cough drops, and chloraseptic spray can help with sore throat.  - Warm tea with honey can help with cough and sore throat. Honey is a natural cough suppressant.  - Dextromethorphan (DM) is a cough suppressant over the counter (ie. mucinex DM, robitussin, delsym; dayquil/nyquil has DM as well.  - Pepto bismal can be taken to help w/ diarrhea.  - Please make an appointment with your primary care provider in the next 3 days if symptoms not improved.

## 2025-05-05 ENCOUNTER — HOSPITAL ENCOUNTER (EMERGENCY)
Facility: HOSPITAL | Age: 45
Discharge: ANOTHER HOSPITAL | End: 2025-05-05
Attending: FAMILY MEDICINE
Payer: MEDICAID

## 2025-05-05 VITALS
RESPIRATION RATE: 20 BRPM | WEIGHT: 280 LBS | HEIGHT: 62 IN | DIASTOLIC BLOOD PRESSURE: 93 MMHG | SYSTOLIC BLOOD PRESSURE: 184 MMHG | TEMPERATURE: 99 F | HEART RATE: 97 BPM | BODY MASS INDEX: 51.53 KG/M2 | OXYGEN SATURATION: 95 %

## 2025-05-05 DIAGNOSIS — Z13.6 SCREENING FOR CARDIOVASCULAR CONDITION: ICD-10-CM

## 2025-05-05 DIAGNOSIS — J45.50 SEVERE PERSISTENT ASTHMA, UNSPECIFIED WHETHER COMPLICATED: Primary | ICD-10-CM

## 2025-05-05 DIAGNOSIS — J22 LRTI (LOWER RESPIRATORY TRACT INFECTION): ICD-10-CM

## 2025-05-05 DIAGNOSIS — R94.31 ABNORMAL EKG: ICD-10-CM

## 2025-05-05 DIAGNOSIS — R06.02 SOB (SHORTNESS OF BREATH): ICD-10-CM

## 2025-05-05 PROBLEM — K76.6 PORTAL HYPERTENSION: Status: ACTIVE | Noted: 2025-05-05

## 2025-05-05 PROBLEM — E66.01 SEVERE OBESITY (BMI >= 40): Status: ACTIVE | Noted: 2025-05-05

## 2025-05-05 PROBLEM — J45.909 ASTHMA: Status: ACTIVE | Noted: 2025-05-05

## 2025-05-05 PROBLEM — R65.10 SIRS (SYSTEMIC INFLAMMATORY RESPONSE SYNDROME): Status: ACTIVE | Noted: 2025-05-05

## 2025-05-05 PROBLEM — K76.6 PORTAL HYPERTENSION: Status: RESOLVED | Noted: 2025-05-05 | Resolved: 2025-05-05

## 2025-05-05 PROBLEM — I10 HYPERTENSION: Status: ACTIVE | Noted: 2025-05-05

## 2025-05-05 PROBLEM — F17.200 SMOKER: Status: ACTIVE | Noted: 2025-05-05

## 2025-05-05 LAB
ABSOLUTE EOSINOPHIL (OHS): 0.2 K/UL
ABSOLUTE MONOCYTE (OHS): 0.89 K/UL (ref 0.3–1)
ABSOLUTE NEUTROPHIL COUNT (OHS): 8.67 K/UL (ref 1.8–7.7)
ALBUMIN SERPL BCP-MCNC: 4.5 G/DL (ref 3.5–5.2)
ALP SERPL-CCNC: 100 UNIT/L (ref 38–126)
ALT SERPL W/O P-5'-P-CCNC: 27 UNIT/L (ref 10–44)
ANION GAP (OHS): 12 MMOL/L (ref 8–16)
APTT PPP: 25.7 SECONDS (ref 21–32)
AST SERPL-CCNC: 34 UNIT/L (ref 15–46)
BASOPHILS # BLD AUTO: 0.04 K/UL
BASOPHILS NFR BLD AUTO: 0.4 %
BILIRUB SERPL-MCNC: 0.7 MG/DL (ref 0.1–1)
BILIRUB UR QL STRIP.AUTO: NEGATIVE
BUN SERPL-MCNC: 14 MG/DL (ref 7–17)
CALCIUM SERPL-MCNC: 9.4 MG/DL (ref 8.7–10.5)
CHLORIDE SERPL-SCNC: 99 MMOL/L (ref 95–110)
CLARITY UR: CLEAR
CO2 SERPL-SCNC: 27 MMOL/L (ref 23–29)
COLOR UR AUTO: YELLOW
CREAT SERPL-MCNC: 1 MG/DL (ref 0.5–1.4)
CRP SERPL-MCNC: 3.66 MG/DL
ERYTHROCYTE [DISTWIDTH] IN BLOOD BY AUTOMATED COUNT: 13.3 % (ref 11.5–14.5)
GFR SERPLBLD CREATININE-BSD FMLA CKD-EPI: >60 ML/MIN/1.73/M2
GLUCOSE SERPL-MCNC: 115 MG/DL (ref 70–110)
GLUCOSE UR QL STRIP: NEGATIVE
GROUP A STREP MOLECULAR (OHS): NEGATIVE
HCT VFR BLD AUTO: 44.2 % (ref 37–48.5)
HGB BLD-MCNC: 14.8 GM/DL (ref 12–16)
HGB UR QL STRIP: NEGATIVE
HOLD SPECIMEN: NORMAL
IMM GRANULOCYTES # BLD AUTO: 0.04 K/UL (ref 0–0.04)
IMM GRANULOCYTES NFR BLD AUTO: 0.4 % (ref 0–0.5)
INFLUENZA A MOLECULAR (OHS): NEGATIVE
INFLUENZA B MOLECULAR (OHS): NEGATIVE
INR PPP: 1 (ref 0.8–1.2)
KETONES UR QL STRIP: NEGATIVE
LACTATE SERPL-SCNC: 1.4 MMOL/L (ref 0.5–2.2)
LACTATE SERPL-SCNC: 1.5 MMOL/L (ref 0.5–2.2)
LEUKOCYTE ESTERASE UR QL STRIP: NEGATIVE
LYMPHOCYTES # BLD AUTO: 0.77 K/UL (ref 1–4.8)
MAGNESIUM SERPL-MCNC: 1.8 MG/DL (ref 1.6–2.6)
MCH RBC QN AUTO: 29.1 PG (ref 27–31)
MCHC RBC AUTO-ENTMCNC: 33.5 G/DL (ref 32–36)
MCV RBC AUTO: 87 FL (ref 82–98)
NITRITE UR QL STRIP: NEGATIVE
NT-PROBNP SERPL-MCNC: 141 PG/ML (ref 5–450)
NUCLEATED RBC (/100WBC) (OHS): 0 /100 WBC
OHS QRS DURATION: 80 MS
OHS QRS DURATION: 82 MS
OHS QTC CALCULATION: 458 MS
OHS QTC CALCULATION: 464 MS
PH UR STRIP: 6 [PH]
PHOSPHATE SERPL-MCNC: 3.3 MG/DL (ref 2.7–4.5)
PLATELET # BLD AUTO: 239 K/UL (ref 150–450)
PMV BLD AUTO: 12.1 FL (ref 9.2–12.9)
POTASSIUM SERPL-SCNC: 4 MMOL/L (ref 3.5–5.1)
PROT SERPL-MCNC: 8.2 GM/DL (ref 6–8.4)
PROT UR QL STRIP: NEGATIVE
PROTHROMBIN TIME: 11.3 SECONDS (ref 9–12.5)
RBC # BLD AUTO: 5.08 M/UL (ref 4–5.4)
RELATIVE EOSINOPHIL (OHS): 1.9 %
RELATIVE LYMPHOCYTE (OHS): 7.3 % (ref 18–48)
RELATIVE MONOCYTE (OHS): 8.4 % (ref 4–15)
RELATIVE NEUTROPHIL (OHS): 81.6 % (ref 38–73)
SARS-COV-2 RDRP RESP QL NAA+PROBE: NEGATIVE
SODIUM SERPL-SCNC: 138 MMOL/L (ref 136–145)
SP GR UR STRIP: 1.01
TROPONIN I SERPL DL<=0.01 NG/ML-MCNC: <0.012 NG/ML (ref 0–0.03)
TROPONIN I SERPL DL<=0.01 NG/ML-MCNC: <0.012 NG/ML (ref 0–0.03)
UROBILINOGEN UR STRIP-ACNC: NEGATIVE EU/DL
WBC # BLD AUTO: 10.61 K/UL (ref 3.9–12.7)

## 2025-05-05 PROCEDURE — 99285 EMERGENCY DEPT VISIT HI MDM: CPT | Mod: 25,ER

## 2025-05-05 PROCEDURE — 94640 AIRWAY INHALATION TREATMENT: CPT | Mod: ER

## 2025-05-05 PROCEDURE — 93010 ELECTROCARDIOGRAM REPORT: CPT | Mod: ,,, | Performed by: STUDENT IN AN ORGANIZED HEALTH CARE EDUCATION/TRAINING PROGRAM

## 2025-05-05 PROCEDURE — 87040 BLOOD CULTURE FOR BACTERIA: CPT | Mod: ER | Performed by: FAMILY MEDICINE

## 2025-05-05 PROCEDURE — 83605 ASSAY OF LACTIC ACID: CPT | Mod: ER | Performed by: FAMILY MEDICINE

## 2025-05-05 PROCEDURE — 84100 ASSAY OF PHOSPHORUS: CPT | Mod: ER | Performed by: FAMILY MEDICINE

## 2025-05-05 PROCEDURE — 63600175 PHARM REV CODE 636 W HCPCS: Mod: ER | Performed by: FAMILY MEDICINE

## 2025-05-05 PROCEDURE — 86140 C-REACTIVE PROTEIN: CPT | Mod: ER | Performed by: FAMILY MEDICINE

## 2025-05-05 PROCEDURE — 96375 TX/PRO/DX INJ NEW DRUG ADDON: CPT | Mod: ER

## 2025-05-05 PROCEDURE — 81003 URINALYSIS AUTO W/O SCOPE: CPT | Mod: ER | Performed by: FAMILY MEDICINE

## 2025-05-05 PROCEDURE — 25000242 PHARM REV CODE 250 ALT 637 W/ HCPCS: Mod: ER | Performed by: FAMILY MEDICINE

## 2025-05-05 PROCEDURE — 96365 THER/PROPH/DIAG IV INF INIT: CPT | Mod: ER

## 2025-05-05 PROCEDURE — 93005 ELECTROCARDIOGRAM TRACING: CPT | Mod: ER

## 2025-05-05 PROCEDURE — 25500020 PHARM REV CODE 255: Mod: ER | Performed by: FAMILY MEDICINE

## 2025-05-05 PROCEDURE — 87502 INFLUENZA DNA AMP PROBE: CPT | Mod: ER | Performed by: FAMILY MEDICINE

## 2025-05-05 PROCEDURE — U0002 COVID-19 LAB TEST NON-CDC: HCPCS | Mod: ER | Performed by: FAMILY MEDICINE

## 2025-05-05 PROCEDURE — 85025 COMPLETE CBC W/AUTO DIFF WBC: CPT | Mod: ER | Performed by: FAMILY MEDICINE

## 2025-05-05 PROCEDURE — 80053 COMPREHEN METABOLIC PANEL: CPT | Mod: ER | Performed by: FAMILY MEDICINE

## 2025-05-05 PROCEDURE — 87651 STREP A DNA AMP PROBE: CPT | Mod: ER | Performed by: FAMILY MEDICINE

## 2025-05-05 PROCEDURE — 83880 ASSAY OF NATRIURETIC PEPTIDE: CPT | Mod: ER | Performed by: FAMILY MEDICINE

## 2025-05-05 PROCEDURE — 87070 CULTURE OTHR SPECIMN AEROBIC: CPT | Mod: ER | Performed by: FAMILY MEDICINE

## 2025-05-05 PROCEDURE — 83735 ASSAY OF MAGNESIUM: CPT | Mod: ER | Performed by: FAMILY MEDICINE

## 2025-05-05 PROCEDURE — 85610 PROTHROMBIN TIME: CPT | Mod: ER | Performed by: FAMILY MEDICINE

## 2025-05-05 PROCEDURE — 25000003 PHARM REV CODE 250: Mod: ER | Performed by: FAMILY MEDICINE

## 2025-05-05 PROCEDURE — 84484 ASSAY OF TROPONIN QUANT: CPT | Mod: ER | Performed by: FAMILY MEDICINE

## 2025-05-05 PROCEDURE — 85730 THROMBOPLASTIN TIME PARTIAL: CPT | Mod: ER | Performed by: FAMILY MEDICINE

## 2025-05-05 RX ORDER — CEFTRIAXONE 1 G/1
1 INJECTION, POWDER, FOR SOLUTION INTRAMUSCULAR; INTRAVENOUS
Status: COMPLETED | OUTPATIENT
Start: 2025-05-05 | End: 2025-05-05

## 2025-05-05 RX ORDER — ALBUTEROL SULFATE 2.5 MG/.5ML
2.5 SOLUTION RESPIRATORY (INHALATION)
Status: COMPLETED | OUTPATIENT
Start: 2025-05-05 | End: 2025-05-05

## 2025-05-05 RX ORDER — ALBUTEROL SULFATE 2.5 MG/.5ML
5 SOLUTION RESPIRATORY (INHALATION)
Status: COMPLETED | OUTPATIENT
Start: 2025-05-05 | End: 2025-05-05

## 2025-05-05 RX ORDER — DOXYCYCLINE HYCLATE 100 MG
100 TABLET ORAL
Status: COMPLETED | OUTPATIENT
Start: 2025-05-05 | End: 2025-05-05

## 2025-05-05 RX ORDER — BENZONATATE 100 MG/1
200 CAPSULE ORAL
Status: COMPLETED | OUTPATIENT
Start: 2025-05-05 | End: 2025-05-05

## 2025-05-05 RX ORDER — METHYLPREDNISOLONE SOD SUCC 125 MG
125 VIAL (EA) INJECTION
Status: COMPLETED | OUTPATIENT
Start: 2025-05-05 | End: 2025-05-05

## 2025-05-05 RX ORDER — LEVOFLOXACIN 5 MG/ML
750 INJECTION, SOLUTION INTRAVENOUS
Status: DISCONTINUED | OUTPATIENT
Start: 2025-05-05 | End: 2025-05-05

## 2025-05-05 RX ORDER — ACETAMINOPHEN 325 MG/1
650 TABLET ORAL
Status: COMPLETED | OUTPATIENT
Start: 2025-05-05 | End: 2025-05-05

## 2025-05-05 RX ADMIN — DOXYCYCLINE HYCLATE 100 MG: 100 TABLET, FILM COATED ORAL at 11:05

## 2025-05-05 RX ADMIN — CEFTRIAXONE SODIUM 1 G: 1 INJECTION, POWDER, FOR SOLUTION INTRAMUSCULAR; INTRAVENOUS at 08:05

## 2025-05-05 RX ADMIN — ALBUTEROL SULFATE 2.5 MG: 2.5 SOLUTION RESPIRATORY (INHALATION) at 08:05

## 2025-05-05 RX ADMIN — ALBUTEROL SULFATE 5 MG: 2.5 SOLUTION RESPIRATORY (INHALATION) at 09:05

## 2025-05-05 RX ADMIN — ALBUTEROL SULFATE 2.5 MG: 2.5 SOLUTION RESPIRATORY (INHALATION) at 07:05

## 2025-05-05 RX ADMIN — ACETAMINOPHEN 650 MG: 325 TABLET ORAL at 07:05

## 2025-05-05 RX ADMIN — METHYLPREDNISOLONE SODIUM SUCCINATE 125 MG: 125 INJECTION, POWDER, FOR SOLUTION INTRAMUSCULAR; INTRAVENOUS at 09:05

## 2025-05-05 RX ADMIN — IOHEXOL 100 ML: 350 INJECTION, SOLUTION INTRAVENOUS at 09:05

## 2025-05-05 RX ADMIN — PROMETHAZINE HYDROCHLORIDE 6.25 MG: 25 INJECTION INTRAMUSCULAR; INTRAVENOUS at 10:05

## 2025-05-05 RX ADMIN — BENZONATATE 200 MG: 100 CAPSULE ORAL at 10:05

## 2025-05-05 NOTE — ED PROVIDER NOTES
Encounter Date: 2025       History     Chief Complaint   Patient presents with    Shortness of Breath    Cough     Patient reports sudden onset of SOB with coughing and wheezing.      44-year-old female complains of sore throat which started on Wednesday.  She continued to have sore throat in addition to nasal congestion and cough started on Saturday.  Patient claims her symptoms progressively got worse on  with mild shortness of breath.  Last night her shortness of breath got worse along with cough.  Mucus production.  Green yellow white in color.  Patient also claims she had fever with wheezing.    The history is provided by the patient.     Review of patient's allergies indicates:   Allergen Reactions    Penicillin Other (See Comments)     Pt reports she was young she was told not to take this medicine.      History reviewed. No pertinent past medical history.  Past Surgical History:   Procedure Laterality Date     SECTION       Family History   Problem Relation Name Age of Onset    No Known Problems Mother      No Known Problems Father       Social History[1]  Review of Systems   Constitutional:  Positive for fever.   Respiratory:  Positive for cough, shortness of breath and wheezing.    All other systems reviewed and are negative.      Physical Exam     Initial Vitals [25 0640]   BP Pulse Resp Temp SpO2   (!) 204/110 (!) 120 (!) 22 (!) 100.5 °F (38.1 °C) 97 %      MAP       --         Physical Exam    Nursing note and vitals reviewed.  Constitutional: Vital signs are normal. She appears well-developed and well-nourished. She is active. No distress.   HENT:   Head: Normocephalic.   Nose: Nose normal. Mouth/Throat: Oropharynx is clear and moist and mucous membranes are normal.   Eyes: Conjunctivae, EOM and lids are normal.   Neck: Neck supple.   Normal range of motion.  Cardiovascular:  Normal rate, regular rhythm, S1 normal, S2 normal and normal heart sounds.           Pulmonary/Chest:  Accessory muscle usage present. Tachypnea noted. She is in respiratory distress. She has decreased breath sounds in the right lower field and the left lower field. She has wheezes in the right middle field and the left middle field. She has rhonchi in the right middle field and the left middle field. She has no rales.   Abdominal: Abdomen is soft. Bowel sounds are normal. She exhibits no distension. There is no abdominal tenderness. There is no rebound and no guarding.   Musculoskeletal:         General: Normal range of motion.      Right upper arm: Normal.      Left upper arm: Normal.      Cervical back: Normal range of motion and neck supple.      Right lower leg: Normal.      Left lower leg: Normal.     Neurological: She is alert and oriented to person, place, and time. She has normal strength. GCS score is 15. GCS eye subscore is 4. GCS verbal subscore is 5. GCS motor subscore is 6.   Skin: Skin is warm. Capillary refill takes less than 2 seconds.   Psychiatric: She has a normal mood and affect. Her speech is normal and behavior is normal. Thought content normal. Cognition and memory are normal.         ED Course   Critical Care    Date/Time: 5/5/2025 11:59 AM    Performed by: Madi Howell MD  Authorized by: Madi Howell MD  Direct patient critical care time: 10 minutes  Additional history critical care time: 10 minutes  Ordering / reviewing critical care time: 10 minutes  Documentation critical care time: 10 minutes  Consulting other physicians critical care time: 10 minutes  Consult with family critical care time: 10 minutes  Other critical care time: 10 minutes  Total critical care time (exclusive of procedural time) : 70 minutes  Critical care was necessary to treat or prevent imminent or life-threatening deterioration of the following conditions: respiratory failure.        Labs Reviewed   COMPREHENSIVE METABOLIC PANEL - Abnormal       Result Value    Sodium 138      Potassium 4.0       Chloride 99      CO2 27      Glucose 115 (*)     BUN 14      Creatinine 1.0      Calcium 9.4      Protein Total 8.2      Albumin 4.5      Bilirubin Total 0.7            AST 34      ALT 27      Anion Gap 12      eGFR >60     CBC WITH DIFFERENTIAL - Abnormal    WBC 10.61      RBC 5.08      HGB 14.8      HCT 44.2      MCV 87      MCH 29.1      MCHC 33.5      RDW 13.3      Platelet Count 239      MPV 12.1      Nucleated RBC 0      Neut % 81.6 (*)     Lymph % 7.3 (*)     Mono % 8.4      Eos % 1.9      Basophil % 0.4      Imm Grans % 0.4      Neut # 8.67 (*)     Lymph # 0.77 (*)     Mono # 0.89      Eos # 0.20      Baso # 0.04      Imm Grans # 0.04     C-REACTIVE PROTEIN - Abnormal    CRP 3.66 (*)    INFLUENZA A & B BY MOLECULAR - Normal    INFLUENZA A MOLECULAR Negative      INFLUENZA B MOLECULAR  Negative     GROUP A STREP, MOLECULAR - Normal    Group A Strep Molecular Negative      Narrative:     Arcanobacterium haemolyticum and Beta Streptococcus group C and G will not be detected by this test method.  Please order Throat Culture ([id number]) if suspected.       SARS-COV-2 RNA AMPLIFICATION, QUAL - Normal    SARS COV-2 Molecular Negative     LACTIC ACID, PLASMA - Normal    Lactic Acid Level 1.5      Narrative:     Falsely low lactic acid results can be found in samples containing >=13.0 mg/dL total bilirubin and/or >=3.5 mg/dL direct bilirubin.    URINALYSIS, REFLEX TO URINE CULTURE - Normal    Color, UA Yellow      Appearance, UA Clear      pH, UA 6.0      Spec Grav UA 1.015      Protein, UA Negative      Glucose, UA Negative      Ketones, UA Negative      Bilirubin, UA Negative      Blood, UA Negative      Nitrites, UA Negative      Urobilinogen, UA Negative      Leukocyte Esterase, UA Negative     TROPONIN I - Normal    Troponin-I <0.012     NT-PRO NATRIURETIC PEPTIDE - Normal    NT-proBNP 141     PROTIME-INR - Normal    PT 11.3      INR 1.0     APTT - Normal    PTT 25.7     PHOSPHORUS - Normal    Phosphorus  Level 3.3     MAGNESIUM - Normal    Magnesium  1.8     LACTIC ACID, PLASMA - Normal    Lactic Acid Level 1.4      Narrative:     Falsely low lactic acid results can be found in samples containing >=13.0 mg/dL total bilirubin and/or >=3.5 mg/dL direct bilirubin.    TROPONIN I - Normal    Troponin-I <0.012     CULTURE, BLOOD   CULTURE, BLOOD   CULTURE, RESPIRATORY  - THROAT   CBC W/ AUTO DIFFERENTIAL    Narrative:     The following orders were created for panel order CBC auto differential.  Procedure                               Abnormality         Status                     ---------                               -----------         ------                     CBC with Differential[[id number]]       Abnormal            Final result                 Please view results for these tests on the individual orders.   GREY TOP URINE HOLD    Extra Tube Hold for add-ons.       EKG Readings: (Independently Interpreted)   Rhythm: Sinus Tachycardia. Heart Rate: 122. ST Segments: Non-Specific ST Segment Depression. T Waves Flipped: II, III, AVF, V4, V5 and V6. Q Waves: V2 and V3.       Imaging Results              CTA Chest Non-Coronary (PE Studies) (Final result)  Result time 05/05/25 10:19:03      Final result by Yong Orozco MD (05/05/25 10:19:03)                   Impression:      No pulmonary embolus or acute pulmonary opacity.    All CT scans at this facility are performed  using dose modulation techniques as appropriate to performed exam including the following:  automated exposure control; adjustment of mA and/or kV according to the patients size (this includes techniques or standardized protocols for targeted exams where dose is matched to indication/reason for exam: i.e. extremities or head);  iterative reconstruction technique.      Electronically signed by: Yong Orozco  Date:    05/05/2025  Time:    10:19               Narrative:    EXAMINATION:  CTA CHEST NON CORONARY (PE STUDIES)    CLINICAL  HISTORY:  Pulmonary embolism (PE) suspected, high prob;    TECHNIQUE:  Low dose axial images, sagittal and coronal reformations were obtained from the thoracic inlet to the lung bases following the IV administration of 100 mL of Omnipaque 350.  Contrast timing was optimized to evaluate the pulmonary arteries.  MIP images were performed.    COMPARISON:  None    FINDINGS:  Base of Neck: No significant abnormality.    Thoracic soft tissues: Unremarkable.    Aorta: Left-sided aortic arch.  No aneurysm and no significant atherosclerosis    Heart: Normal size. No effusion.    Pulmonary vasculature: Pulmonary arteries are well opacified.  There is no pulmonary thromboembolism.    Sakshi/Mediastinum: No pathologic talha enlargement.    Airways: Patent.    Lungs/Pleura: Clear lungs. No pleural effusion or thickening.    Esophagus: Unremarkable.    Upper Abdomen: No abnormality of the partially imaged upper abdomen.    Bones: No acute fracture. No suspicious lytic or sclerotic lesions.                                       X-Ray Chest AP Portable (Final result)  Result time 05/05/25 07:24:03      Final result by Allan Guerrero MD (05/05/25 07:24:03)                   Impression:      No acute finding in the chest.      Electronically signed by: Allan Guerrero  Date:    05/05/2025  Time:    07:24               Narrative:    EXAMINATION:  XR CHEST AP PORTABLE    CLINICAL HISTORY:  cough;    FINDINGS:  Comparison: None available.    Mediastinal silhouette is within normal limits.  The lungs are clear.  No pneumothorax or pleural effusion.  No acute osseous finding.                                       Medications   acetaminophen tablet 650 mg (650 mg Oral Given 5/5/25 0710)   albuterol sulfate nebulizer solution 2.5 mg (2.5 mg Nebulization Given 5/5/25 0716)   cefTRIAXone injection 1 g (1 g Intravenous Given 5/5/25 0808)   albuterol sulfate nebulizer solution 2.5 mg (2.5 mg Nebulization Given 5/5/25 0817)   iohexoL (OMNIPAQUE  350) injection 100 mL (100 mLs Intravenous Given 5/5/25 0910)   albuterol sulfate nebulizer solution 5 mg (5 mg Nebulization Given 5/5/25 0920)   methylPREDNISolone sodium succinate injection 125 mg (125 mg Intravenous Given 5/5/25 0925)   benzonatate capsule 200 mg (200 mg Oral Given 5/5/25 1003)   promethazine (PHENERGAN) 6.25 mg in 0.9% NaCl 50 mL IVPB (0 mg Intravenous Stopped 5/5/25 1049)   doxycycline tablet 100 mg (100 mg Oral Given 5/5/25 1127)     Medical Decision Making  Differential diagnosis include not limited to sepsis, pneumonia, CHF, asthma, pulmonary embolism, acute bronchitis, respiratory distress,  URI,- pharyngitis,    Influenza, COVID and strep are negative.  Chest x-ray no acute findings.  Bilateral significant wheeze started improving with albuterol nebulization x3.  Patient also given Rocephin and Solu-Medrol.  On revaluation patient has persistent wheezing.  CTA of chest negative for pulmonary embolism.  Possible lower respiratory tract infection with acute wheeze and respiratory distress.  Since patient is still symptomatic with wheeze and respiratory distress will admit for further treatment as inpatient.      Amount and/or Complexity of Data Reviewed  Labs: ordered. Decision-making details documented in ED Course.     Details: Labs reviewed  Radiology: ordered and independent interpretation performed. Decision-making details documented in ED Course.     Details: Chest x-ray no pneumonia.  ECG/medicine tests: ordered and independent interpretation performed. Decision-making details documented in ED Course.     Details: Nonspecific ST depressions in inferior and lateral leads.  Sinus tachycardia.  Discussion of management or test interpretation with external provider(s): Discuss with Dr. Dolan- regarding persistent wheeze after multiple treatments.  Chest x-ray negative, CTA negative for PE.  Requiring multiple breathing treatments with respiratory distress will admit patient for further  evaluation and management.  Destination [facility].    Risk  OTC drugs.  Prescription drug management.  Decision regarding hospitalization.                                      Clinical Impression:  Final diagnoses:  [R06.02] SOB (shortness of breath)  [Z13.6] Screening for cardiovascular condition  [R94.31] Abnormal EKG  [J45.50] Severe persistent asthma, unspecified whether complicated (Primary)  [J22] LRTI (lower respiratory tract infection)          ED Disposition Condition    Transfer to Another Facility Stable                    [1]   Social History  Tobacco Use    Smoking status: Every Day     Current packs/day: 0.25     Types: Cigarettes    Smokeless tobacco: Never   Substance Use Topics    Alcohol use: No    Drug use: No        Madi Howell MD  05/05/25 1200

## 2025-05-05 NOTE — PHARMACY MED REC
"[facility]           Pharmacy  Admission Medication History     The home medication history was taken by Soo Oreilly.      Medication history obtained from UNABLE TO ASSESS PATIENT    Based on information gathered for medication list, you may go to "Admission" then "Reconcile Home Medications" tabs to review and/or act upon those items.     The home medication list has been updated by the Pharmacy department.   Please read ALL comments highlighted in yellow.   Please address this information as you see fit.    Feel free to contact us if you have any questions or require assistance.      No current facility-administered medications on file prior to encounter.     Current Outpatient Medications on File Prior to Encounter   Medication Sig Dispense Refill    acetaminophen (TYLENOL) 500 MG tablet Take 2 tablets (1,000 mg total) by mouth every 6 (six) hours as needed for Pain or Temperature greater than. 50 tablet 0    amLODIPine (NORVASC) 5 MG tablet Take 1 tablet (5 mg total) by mouth once daily. 30 tablet 0    diclofenac (VOLTAREN) 50 MG EC tablet Take 1 tablet (50 mg total) by mouth 3 (three) times daily as needed (pain). Prn pain 30 tablet 0    naproxen (EC NAPROSYN) 500 MG EC tablet Take 1 tablet (500 mg total) by mouth 2 (two) times daily. 20 tablet 0    traMADoL (ULTRAM) 50 mg tablet Take 1 tablet (50 mg total) by mouth every 6 (six) hours as needed for Pain. 12 tablet 0       Please address this information as you see fit.  Feel free to contact us if you have any questions or require assistance.    Soo Oreilly  [phone]                  .          "

## 2025-05-07 PROBLEM — R65.10 SIRS (SYSTEMIC INFLAMMATORY RESPONSE SYNDROME): Status: RESOLVED | Noted: 2025-05-05 | Resolved: 2025-05-07

## 2025-05-07 LAB — BACTERIA THROAT CULT: NORMAL

## 2025-05-10 LAB
BACTERIA BLD CULT: NORMAL
BACTERIA BLD CULT: NORMAL

## 2025-06-04 ENCOUNTER — LAB VISIT (OUTPATIENT)
Dept: LAB | Facility: HOSPITAL | Age: 45
End: 2025-06-04
Attending: PODIATRIST
Payer: MEDICAID

## 2025-06-04 DIAGNOSIS — B35.1 DERMATOPHYTOSIS OF NAIL: Primary | ICD-10-CM

## 2025-06-04 LAB
ALT SERPL W/O P-5'-P-CCNC: 20 UNIT/L (ref 10–44)
AST SERPL-CCNC: 25 UNIT/L (ref 15–46)

## 2025-06-04 PROCEDURE — 36415 COLL VENOUS BLD VENIPUNCTURE: CPT | Mod: PN

## 2025-06-04 PROCEDURE — 84460 ALANINE AMINO (ALT) (SGPT): CPT | Mod: PN

## 2025-06-04 PROCEDURE — 84450 TRANSFERASE (AST) (SGOT): CPT | Mod: PN
